# Patient Record
Sex: FEMALE | URBAN - METROPOLITAN AREA
[De-identification: names, ages, dates, MRNs, and addresses within clinical notes are randomized per-mention and may not be internally consistent; named-entity substitution may affect disease eponyms.]

---

## 2019-02-11 ENCOUNTER — IMPORTED ENCOUNTER (OUTPATIENT)
Dept: URBAN - METROPOLITAN AREA CLINIC 9 | Facility: CLINIC | Age: 79
End: 2019-02-11

## 2019-02-11 PROBLEM — Z98.890: Noted: 2019-02-11

## 2019-02-11 PROBLEM — H43.813: Noted: 2019-02-11

## 2020-01-28 NOTE — PATIENT DISCUSSION
Gay Visual Field 36 point screen: I have reviewed the visual hernandez, performed by Dr. Brenda Lam, both taped and untaped on this patient which demonstrate significant obstruction of the patient's peripheral visual field on both eyes.

## 2020-03-06 NOTE — PATIENT DISCUSSION
Also, please do not hesitate to call us if you have any concerns not addressed by this information. Please call [phone] and we will do everything we can to help you during this period.

## 2020-07-22 NOTE — PATIENT DISCUSSION
FAMILY HX OF GLAUCOMA - NARROW ANGLES ON SLIT LAMP EXAM TODAY. CANNOT RULE OUT ACUTE EYE PAIN RELATED TO IOP ELEVATION. IOP WNL TODAY. KEEP F/U WITH DR Devonte Sadler.

## 2020-07-22 NOTE — PATIENT DISCUSSION
New Prescription: Lotemax SM (loteprednol etabonate): drops,gel: 0.38% 1 drop twice a day into both eyes 07-

## 2020-07-22 NOTE — PATIENT DISCUSSION
New Prescription: Restasis (cyclosporine): dropperette: 0.05% 1 drop twice a day as directed into both eyes 07- [id number] R

## 2020-07-22 NOTE — PATIENT DISCUSSION
K SICCA OU: NO IMPROVEMENT WITH PRESERVATIVE FREE ARTIFICIAL TEARS 4-6X A DAY, OU  ADD NIGHTLY LUBRICATING OINTMENT OR GEL. PRESCRIBED RESTASIS BID WITH LOTEMAX SM BRIDGE. CONSIDER PUNCTAL PLUGS AND/OR LIPIFLOW TREATMENT NEXT VISIT IF NOT RESPONSIVE OR IF SYMPTOMS PERSIST.

## 2020-12-15 NOTE — PATIENT DISCUSSION
New Prescription: Lotemax SM (loteprednol etabonate): drops,gel: 0.38% 1 drop twice a day into both eyes 12-

## 2020-12-15 NOTE — PATIENT DISCUSSION
FAMILY HX OF GLAUCOMA - NARROW ANGLES ON SLIT LAMP EXAM TODAY WITH PHACOMORPHIC COMPONENT. DISCUSSED RISK OF ANGLE CLOSURE AT LENGHT WITH PT. CALLBACK INSTRUCTIONS GIVEN. NO EVIDENCE OF OPTIC ATROPHY ON EXAM TODAY.   TX R/B/A LPI, ARNAV. PT WISHES TO PROCEED WITH CE, [address] [id number] OSD FIRST

## 2020-12-15 NOTE — PATIENT DISCUSSION
K SICCA OU WITH LL ECTROPION WORSENING EXPOSURE: COMPLAINS OF RESTASIS BURNING. NO IMPROVEMENT WITH PRESERVATIVE FREE ARTIFICIAL TEARS 4-6X A DAY, OU ADD NIGHTLY LUBRICATING OINTMENT OR GEL. RESTART RESTASIS BID WITH LOTEMAX SM BID AFTER RESTASIS.  ADD LL PUNCTAL PLUGS TODAY

## 2021-01-13 NOTE — PATIENT DISCUSSION
CATARACTS, OU - VISUALLY SIGNIFICANT. PT FEELS SHE STILL HAS PERSISTENT GUIDO.  WANTS TO WAIT UNTIL AFTER HER COVID VACCINE FOR SURGERY,

## 2021-01-13 NOTE — PATIENT DISCUSSION
K SICCA OU WITH LL ECTROPION WORSENING EXPOSURE, S/P LL PLUGS OU, CONTINUE RESTASIS BID, SAMPLE OF EYSUVIS QDAY PRN .

## 2021-01-13 NOTE — PATIENT DISCUSSION
Stopped Today: Lotemax SM (loteprednol etabonate): drops,gel: 0.38% 1 drop twice a day into both eyes 12-

## 2021-10-16 ASSESSMENT — VISUAL ACUITY
OD_CC: 20/20 SN
OS_CC: 20/20 -2 SN
OD_CC: 20/25 - SN
OS_CC: 20/20 - SN

## 2021-10-16 ASSESSMENT — TONOMETRY
OD_IOP_MMHG: 9
OS_IOP_MMHG: 12

## 2022-06-19 RX ORDER — ACETAMINOPHEN AND CODEINE PHOSPHATE 300; 60 MG/1; MG/1
TABLET ORAL
COMMUNITY
Start: 2022-01-25 | End: 2022-09-06 | Stop reason: SDUPTHER

## 2022-06-28 RX ORDER — LISINOPRIL 5 MG/1
TABLET ORAL
COMMUNITY
End: 2022-10-24

## 2022-06-28 RX ORDER — BUPROPION HYDROCHLORIDE 150 MG/1
TABLET ORAL
COMMUNITY
End: 2022-10-25 | Stop reason: SDUPTHER

## 2022-06-28 RX ORDER — VILAZODONE HYDROCHLORIDE 40 MG/1
TABLET ORAL
COMMUNITY
End: 2022-10-27

## 2022-06-28 RX ORDER — ESTRADIOL 1 MG/1
TABLET ORAL
COMMUNITY

## 2022-06-28 RX ORDER — TORSEMIDE 20 MG/1
TABLET ORAL
COMMUNITY

## 2022-06-28 RX ORDER — METHYLPREDNISOLONE 4 MG/1
TABLET ORAL
COMMUNITY
Start: 2022-01-25 | End: 2022-10-04 | Stop reason: ALTCHOICE

## 2022-06-28 RX ORDER — MOMETASONE FUROATE 1 MG/G
CREAM TOPICAL
COMMUNITY

## 2022-06-28 RX ORDER — LISINOPRIL 10 MG/1
TABLET ORAL
COMMUNITY
Start: 2022-01-25 | End: 2022-10-25 | Stop reason: SDUPTHER

## 2022-06-28 RX ORDER — BACLOFEN 10 MG/1
TABLET ORAL
COMMUNITY
Start: 2021-10-05 | End: 2022-07-28

## 2022-06-28 RX ORDER — DONEPEZIL HYDROCHLORIDE 10 MG/1
TABLET, FILM COATED ORAL
COMMUNITY
Start: 2022-01-25

## 2022-06-28 RX ORDER — LORAZEPAM 1 MG/1
TABLET ORAL
COMMUNITY
Start: 2022-04-04

## 2022-06-28 RX ORDER — HYDROXYZINE HYDROCHLORIDE 25 MG/1
TABLET, FILM COATED ORAL
COMMUNITY

## 2022-06-28 RX ORDER — PRAVASTATIN SODIUM 80 MG/1
TABLET ORAL
COMMUNITY

## 2022-10-27 PROBLEM — F41.8 ANXIETY ASSOCIATED WITH DEPRESSION: Status: ACTIVE | Noted: 2022-10-27

## 2022-10-27 PROBLEM — E16.2 HYPOGLYCEMIA: Status: ACTIVE | Noted: 2022-10-27

## 2022-10-27 PROBLEM — R41.3 MEMORY LOSS: Status: ACTIVE | Noted: 2022-10-27

## 2022-10-27 PROBLEM — M79.7 FIBROMYALGIA: Status: ACTIVE | Noted: 2022-10-27

## 2022-10-27 PROBLEM — I10 ESSENTIAL HYPERTENSION: Status: ACTIVE | Noted: 2022-10-27

## 2022-10-27 PROBLEM — M50.90 CERVICAL DISC DISEASE: Status: ACTIVE | Noted: 2022-10-27

## 2022-10-27 PROBLEM — K58.0 IRRITABLE BOWEL SYNDROME WITH DIARRHEA: Status: ACTIVE | Noted: 2022-10-27

## 2022-10-27 PROBLEM — I48.0 PAROXYSMAL ATRIAL FIBRILLATION (HCC): Status: ACTIVE | Noted: 2022-10-27

## 2022-10-27 PROBLEM — J45.909 ASTHMA: Status: ACTIVE | Noted: 2022-10-27

## 2022-10-27 PROBLEM — G47.10 HYPERSOMNOLENCE: Status: ACTIVE | Noted: 2022-10-27

## 2022-10-27 PROBLEM — D64.9 ABSOLUTE ANEMIA: Status: ACTIVE | Noted: 2022-10-27

## 2022-10-27 PROBLEM — G62.9 NEUROPATHY: Status: ACTIVE | Noted: 2022-10-27

## 2022-10-27 PROBLEM — L30.9 ECZEMA: Status: ACTIVE | Noted: 2022-10-27

## 2023-01-20 NOTE — PATIENT DISCUSSION
I have examined the patient today and found them to have ectropion due to laxity of the lower eyelid.

## 2023-01-20 NOTE — PATIENT DISCUSSION
The risks and benefits of a lateral tarsal strip were discussed in detail as well as the location of the incision and the recovery process.

## 2023-09-14 ENCOUNTER — ESTABLISHED PATIENT (OUTPATIENT)
Dept: URBAN - METROPOLITAN AREA CLINIC 12 | Facility: CLINIC | Age: 83
End: 2023-09-14

## 2023-09-14 DIAGNOSIS — H04.123: ICD-10-CM

## 2023-09-14 DIAGNOSIS — Z96.1: ICD-10-CM

## 2023-09-14 DIAGNOSIS — H10.13: ICD-10-CM

## 2023-09-14 PROCEDURE — 99213 OFFICE O/P EST LOW 20 MIN: CPT

## 2023-09-14 ASSESSMENT — VISUAL ACUITY
OD_SC: 20/25-2
OS_SC: 20/25-2

## 2023-09-14 ASSESSMENT — TONOMETRY
OS_IOP_MMHG: 10
OD_IOP_MMHG: 11

## 2023-10-26 ENCOUNTER — FOLLOW UP (OUTPATIENT)
Dept: URBAN - METROPOLITAN AREA CLINIC 12 | Facility: CLINIC | Age: 83
End: 2023-10-26

## 2023-10-26 DIAGNOSIS — H04.123: ICD-10-CM

## 2023-10-26 DIAGNOSIS — H10.13: ICD-10-CM

## 2023-10-26 DIAGNOSIS — Z96.1: ICD-10-CM

## 2023-10-26 PROCEDURE — 99213 OFFICE O/P EST LOW 20 MIN: CPT

## 2023-10-26 RX ORDER — CYCLOSPORINE 0.5 MG/ML: 1 EMULSION OPHTHALMIC TWICE A DAY

## 2023-10-26 ASSESSMENT — VISUAL ACUITY
OS_CC: 20/30
OD_CC: 20/30

## 2023-10-26 ASSESSMENT — TONOMETRY
OD_IOP_MMHG: 12
OS_IOP_MMHG: 14

## 2024-01-03 NOTE — PATIENT DISCUSSION
Verified patient using two identifiers. Spoke with wife, Kirstin, confirmed listed on PHI. She states that she is unable to find the pre-procedure instructions for the cardiac cath and wanted to go over them again. Advised patient needs to be NPO after midnight, does not need to hold any medications, needs a  and should arrive at 8:45am. She was appreciative of the phone call. No further questions.    spherecylinderaxisaddprismvertexVAInt VANVExaminer